# Patient Record
Sex: FEMALE | Employment: UNEMPLOYED | URBAN - METROPOLITAN AREA
[De-identification: names, ages, dates, MRNs, and addresses within clinical notes are randomized per-mention and may not be internally consistent; named-entity substitution may affect disease eponyms.]

---

## 2022-08-31 ENCOUNTER — OFFICE VISIT (OUTPATIENT)
Dept: URGENT CARE | Facility: URGENT CARE | Age: 27
End: 2022-08-31
Payer: MEDICAID

## 2022-08-31 VITALS
DIASTOLIC BLOOD PRESSURE: 86 MMHG | WEIGHT: 280.8 LBS | HEART RATE: 109 BPM | SYSTOLIC BLOOD PRESSURE: 123 MMHG | TEMPERATURE: 98.7 F | OXYGEN SATURATION: 100 %

## 2022-08-31 DIAGNOSIS — G44.219 EPISODIC TENSION-TYPE HEADACHE, NOT INTRACTABLE: Primary | ICD-10-CM

## 2022-08-31 PROBLEM — E04.1 THYROID NODULE: Status: ACTIVE | Noted: 2022-07-18

## 2022-08-31 PROBLEM — O99.019 ANTEPARTUM ANEMIA: Status: ACTIVE | Noted: 2022-06-16

## 2022-08-31 PROCEDURE — 99203 OFFICE O/P NEW LOW 30 MIN: CPT | Performed by: PHYSICIAN ASSISTANT

## 2022-08-31 NOTE — PROGRESS NOTES
Chief Complaint   Patient presents with     Headache     Intermittent severe headache for 1 week.        ASSESSMENT/PLAN:  Older was seen today for headache.    Diagnoses and all orders for this visit:    Episodic tension-type headache, not intractable    Patient's headache seems to be consistent with her previous migraines.  Tylenol is improving her symptoms she appears well with a normal neurologic exam and no injury.  Blood pressure within normal limits.  Pulse initially tachycardic but normalized during exam.  She can continue to use the Reglan that has been helpful for her headaches during pregnancy.  Also Tylenol and ibuprofen.  If symptoms persistent follow-up with PCP in the next 1 to 2 weeks    Thiago Oleary PA-C      SUBJECTIVE:  Cierra is a 27 year old female who presents to urgent care with intermittent headache for the last 2 weeks.  It started after she gave birth and returned home.  He can be on both sides of the temples or the forehead.  She was having migraines during pregnancy and Reglan helped this.  She took her Reglan today and her headache has improved.  Otherwise she is been using Tylenol and that improves her symptoms 2.  Headache comes and goes.  No vision changes, balance issues, paresthesias, memory issues, muscle weakness, nausea, vomiting, diarrhea, abdominal pain, fevers or chills    ROS: Pertinent ROS neg other than the symptoms noted above in the HPI.     OBJECTIVE:  /86 (BP Location: Left arm, Patient Position: Sitting, Cuff Size: Adult Large)   Pulse 109   Temp 98.7  F (37.1  C) (Tympanic)   Wt 127.4 kg (280 lb 12.8 oz)   SpO2 100%    GENERAL: healthy, alert and no distress  HENT: nose and mouth without ulcers or lesions  MS: no gross musculoskeletal defects noted, no edema  SKIN: no suspicious lesions or rashes  NEURO: Upper and lower extremity normal strength and tone, mentation intact and speech normal, cranial nerves II through XII intact, normal gait, finger-to-nose  normal    DIAGNOSTICS    No results found for any visits on 08/31/22.     No current outpatient medications on file.     No current facility-administered medications for this visit.      There is no problem list on file for this patient.     No past medical history on file.  No past surgical history on file.  No family history on file.  Social History     Tobacco Use     Smoking status: Not on file     Smokeless tobacco: Not on file   Substance Use Topics     Alcohol use: Not on file              The plan of care was discussed with the patient. They understand and agree with the course of treatment prescribed. A printed summary was given including instructions and medications.  The use of [employer] dictation services may have been used to construct the content in this note; any grammatical or spelling errors are non-intentional. Please contact the author of this note directly if you are in need of any clarification.